# Patient Record
(demographics unavailable — no encounter records)

---

## 2024-11-26 NOTE — PHYSICAL EXAM
[No Acute Distress] : no acute distress [Well Nourished] : well nourished [Well Developed] : well developed [Well-Appearing] : well-appearing [Normal Sclera/Conjunctiva] : normal sclera/conjunctiva [PERRL] : pupils equal round and reactive to light [EOMI] : extraocular movements intact [Normal Outer Ear/Nose] : the outer ears and nose were normal in appearance [Normal Oropharynx] : the oropharynx was normal [No JVD] : no jugular venous distention [No Lymphadenopathy] : no lymphadenopathy [Supple] : supple [Thyroid Normal, No Nodules] : the thyroid was normal and there were no nodules present [No Respiratory Distress] : no respiratory distress  [No Accessory Muscle Use] : no accessory muscle use 16 [Clear to Auscultation] : lungs were clear to auscultation bilaterally [Normal Rate] : normal rate  [Regular Rhythm] : with a regular rhythm [Normal S1, S2] : normal S1 and S2 [No Murmur] : no murmur heard [No Carotid Bruits] : no carotid bruits [No Abdominal Bruit] : a ~M bruit was not heard ~T in the abdomen [No Varicosities] : no varicosities [Pedal Pulses Present] : the pedal pulses are present [No Edema] : there was no peripheral edema [No Palpable Aorta] : no palpable aorta [No Extremity Clubbing/Cyanosis] : no extremity clubbing/cyanosis [Soft] : abdomen soft [Non Tender] : non-tender [Non-distended] : non-distended [No Masses] : no abdominal mass palpated [No HSM] : no HSM [Normal Bowel Sounds] : normal bowel sounds [Normal Posterior Cervical Nodes] : no posterior cervical lymphadenopathy [Normal Anterior Cervical Nodes] : no anterior cervical lymphadenopathy [No CVA Tenderness] : no CVA  tenderness [No Spinal Tenderness] : no spinal tenderness [No Joint Swelling] : no joint swelling [Grossly Normal Strength/Tone] : grossly normal strength/tone [No Rash] : no rash [Coordination Grossly Intact] : coordination grossly intact [No Focal Deficits] : no focal deficits [Normal Gait] : normal gait [Deep Tendon Reflexes (DTR)] : deep tendon reflexes were 2+ and symmetric [Normal Affect] : the affect was normal [Normal Insight/Judgement] : insight and judgment were intact

## 2024-11-27 NOTE — DATA REVIEWED
[FreeTextEntry1] : EKG: Sinus rhythm 62 BPM, no acute ST changes ---------------  POCT - A Urinalysis Dip (Automated)             Final  No Documents Attached    	Test  	Result  	Flag	Reference	Goal	Last Verified  	Glucose	NEG	 	 		REQUIRED  	Bilirubin	NEG	 	 		REQUIRED  	Ketone	NEG	 	 		REQUIRED  	Specific Gravity	1.020	 	 		REQUIRED  	Blood	NEG	 	 		REQUIRED  	pH	6.0	 	 		REQUIRED  	Protein	NEG	 	 		REQUIRED  	Urobilinogen	0.2 EU/dl	 	 		REQUIRED  	Nitrite	NEG	 	 		REQUIRED  	Leukocytes	NEG	 	 		REQUIRED  	Clarity	Clear	 	 		REQUIRED  	Collection Method	Void	 	 		REQUIRED  Ordered by: BARBARA MCDANIEL       Collected/Examined: 26Nov2024 09:42AM       Verification Required       Stage: Final       Performed at: In Office       Performed by: BARBARA MCDANIEL       Resulted: 26Nov2024 09:42AM       Last Updated: 26Nov2024 09:43AM        -------------- POCT - MicroAlbumin             Final  No Documents Attached    	Test  	Result  	Flag	Reference	Goal	Last Verified  	Albumin	80	 	 		REQUIRED 	Albumin to Creatinine Ratio		A	 		REQUIRED  	Creatinine	200	 	 		REQUIRED  Ordered by: BARBARA MCDANIEL       Collected/Examined: 26Nov2024 09:42AM       Verification Required       Stage: Final       Performed at: In Office       Performed by: BARBARA MCDANIEL       Resulted: 26Nov2024 09:42AM       Last Updated: 26Nov2024 09:43AM

## 2024-11-27 NOTE — ASSESSMENT
[FreeTextEntry1] : Dr. Alejandro discussed the importance of and offered the flu shot to SARAH in office today.  SARAH declined.  Venipuncture with labs done in office today, 09:18 AM 11/26/2024  Dr. Alejandro reviewed with SARAH his vitals in office today. SARAH's vitals were unremarkable.  Dr. Alejandro reviewed with Sarah his EKG and urinalysis in office today. Both exams were unremarkable.  Present medications reviewed. Continue present medications.  Sarah should follow up with Dr. Alejandro in 3 months.

## 2024-11-27 NOTE — PLAN
[FreeTextEntry1] : Age appropriate preventative care counseling discussed with patient. Venipuncture with labs drawn in office. Obtained and reviewed EKG, Urinalysis results with patient today. Sent urine to lab for further evaluation of microscopic hematuria found in Urinalysis today. Referred patient to Dr. Arthur Ramesh to receive a cardiac evaluation for abnormal EKG. Medications reviewed. Continue present medications. Return for follow up in 3 months.

## 2024-11-27 NOTE — ADDENDUM
[FreeTextEntry1] : I, Chris Rick, acted solely as a scribe for Dr. Mila Alejandro D.O. on this date 11/26/2024.   All medical record entries made by the Scribe were at my, Dr. Mila Alejandro D.O., direction and personally dictated by me on 11/26/2024. I have reviewed the chart and agree that the record accurately reflects my personal performance of the history, physical exam, assessment and plan. I have also personally directed, reviewed, and agreed with the chart.

## 2024-11-27 NOTE — HEALTH RISK ASSESSMENT
[Patient reported colonoscopy was normal] : Patient reported colonoscopy was normal [ColonoscopyDate] : 11/2024

## 2024-11-27 NOTE — HISTORY OF PRESENT ILLNESS
[FreeTextEntry1] : Here for annual physical examination today. [de-identified] : Sarah is a pleasant 55-year-old gentleman with history of hypercholesterolemia, BPH, vitamin D deficiency, he came in for annual physical examination today.  SARAH is overall feeling well at this time; no respiratory complaints and denies of having any chest pain, bowel movement or urination problems, or abdominal pain.

## 2025-03-01 NOTE — HISTORY OF PRESENT ILLNESS
[FreeTextEntry1] : F/u  [de-identified] : Adrian is a pleasant 56-year-old gentleman with history of hypercholesterolemia, BPH, vitamin D deficiency, who comes in today for follow up visit. The patient reports to be feeling reasonably well at this time with no complaints.

## 2025-03-01 NOTE — SIGNATURES
[TextEntry] : This note was written by Juna José Carpio on 02/27/2025 acting as medical scribe for Dr. Mila Alejandro. I, Dr. Mila Alejandro, have read and attest that all the information, medical decision making and discharge instructions within are true and accurate.

## 2025-03-01 NOTE — PLAN
[FreeTextEntry1] : Venipuncture with labs drawn in office today. Medications reviewed. Continue present medications. Return for follow up in 3 months.

## 2025-03-01 NOTE — SIGNATURES
[TextEntry] : This note was written by Juan José Carpio on 02/27/2025 acting as medical scribe for Dr. Mila Alejandro. I, Dr. Mila Alejandro, have read and attest that all the information, medical decision making and discharge instructions within are true and accurate.

## 2025-03-01 NOTE — HISTORY OF PRESENT ILLNESS
[FreeTextEntry1] : F/u  [de-identified] : Adrian is a pleasant 56-year-old gentleman with history of hypercholesterolemia, BPH, vitamin D deficiency, who comes in today for follow up visit. The patient reports to be feeling reasonably well at this time with no complaints.

## 2025-05-24 NOTE — PLAN
[FreeTextEntry1] : Advised patient to obtain blood work to follow up on liver function and lipid levels while fasting.   Begin taking Prednisone taper for his cough/inflammation. Continue taking Rosuvastatin 20 mg, 1 tablet p.o. daily, for hypercholesterolemia.  Medications reviewed. Continue present medications. Return for follow up in 2 weeks if cough persists.  Return for follow up visit in 2 months.

## 2025-05-24 NOTE — HISTORY OF PRESENT ILLNESS
[FreeTextEntry1] : follow up visit  [de-identified] : Adrian is a pleasant 57-year-old gentleman with history of hypercholesterolemia, BPH, vitamin D deficiency, who comes in today for follow up visit. Adrian c/o a persistent productive cough that is better today but is not fully gone. Adrian denies feeling sick, congestion, or wheezing. He also c/o of a pulled muscle in his right hip and is taking Aleve; denies any bruising or popping feeling.

## 2025-05-24 NOTE — SIGNATURES
[TextEntry] : This note was written by Get Perez on 05/22/2025 acting as medical scribe for Dr. Mila Alejandro. I, Dr. Mila Alejandro, have read and attest that all the information, medical decision making and discharge instructions within are true and accurate.

## 2025-05-24 NOTE — ASSESSMENT
[FreeTextEntry1] : Adrian is a pleasant 57-year-old gentleman with history of hypercholesterolemia, BPH, vitamin D deficiency, who comes in today for follow up visit.

## 2025-05-24 NOTE — HISTORY OF PRESENT ILLNESS
[FreeTextEntry1] : follow up visit  [de-identified] : Adrian is a pleasant 57-year-old gentleman with history of hypercholesterolemia, BPH, vitamin D deficiency, who comes in today for follow up visit. Adrian c/o a persistent productive cough that is better today but is not fully gone. Adrian denies feeling sick, congestion, or wheezing. He also c/o of a pulled muscle in his right hip and is taking Aleve; denies any bruising or popping feeling.

## 2025-06-19 NOTE — PLAN
[FreeTextEntry1] : Advised patient to obtain blood work to follow up on liver function and lipid levels while fasting.   Begin taking Prednisone 40mg a day for 5 days for his cough/inflammation/asthmatic bronchitis. Begin taking Fluticasone Propionate 50 mcg/act, 2 sprays once a day.  Continue taking Rosuvastatin 20 mg, 1 tablet p.o. daily, for hypercholesterolemia.  Medications reviewed. Continue present medications. Return for follow up visit in 2 months.

## 2025-06-19 NOTE — HISTORY OF PRESENT ILLNESS
[FreeTextEntry1] : follow up visit  [de-identified] : Adrian is a pleasant 57-year-old gentleman with history of hypercholesterolemia, BPH, vitamin D deficiency, who comes in today for follow up visit. Adrian c/o a persistent productive cough that is better today but is not fully gone. Adrian denies feeling sick or wheezing. Patient reports that the back of his throat feels itchy.

## 2025-06-19 NOTE — HISTORY OF PRESENT ILLNESS
[FreeTextEntry1] : follow up visit  [de-identified] : Adrian is a pleasant 57-year-old gentleman with history of hypercholesterolemia, BPH, vitamin D deficiency, who comes in today for follow up visit. Adrian c/o a persistent productive cough that is better today but is not fully gone. Adrian denies feeling sick or wheezing. Patient reports that the back of his throat feels itchy.